# Patient Record
Sex: FEMALE | Race: WHITE | NOT HISPANIC OR LATINO | URBAN - METROPOLITAN AREA
[De-identification: names, ages, dates, MRNs, and addresses within clinical notes are randomized per-mention and may not be internally consistent; named-entity substitution may affect disease eponyms.]

---

## 2022-08-16 ENCOUNTER — OFFICE VISIT (OUTPATIENT)
Dept: URBAN - METROPOLITAN AREA CLINIC 35 | Facility: CLINIC | Age: 77
End: 2022-08-16
Payer: MEDICARE

## 2022-08-16 ENCOUNTER — DASHBOARD ENCOUNTERS (OUTPATIENT)
Age: 77
End: 2022-08-16

## 2022-08-16 VITALS
BODY MASS INDEX: 30.55 KG/M2 | OXYGEN SATURATION: 97 % | WEIGHT: 166 LBS | DIASTOLIC BLOOD PRESSURE: 78 MMHG | SYSTOLIC BLOOD PRESSURE: 134 MMHG | HEIGHT: 62 IN | HEART RATE: 70 BPM

## 2022-08-16 DIAGNOSIS — R11.0 NAUSEA: ICD-10-CM

## 2022-08-16 DIAGNOSIS — R09.89 GLOBUS SENSATION: ICD-10-CM

## 2022-08-16 DIAGNOSIS — K91.5 POST-CHOLECYSTECTOMY SYNDROME: ICD-10-CM

## 2022-08-16 DIAGNOSIS — Z86.010 PERSONAL HISTORY OF COLONIC POLYPS: ICD-10-CM

## 2022-08-16 PROBLEM — 90782003: Status: ACTIVE | Noted: 2022-08-16

## 2022-08-16 PROBLEM — 267103008: Status: ACTIVE | Noted: 2022-08-16

## 2022-08-16 PROCEDURE — 99204 OFFICE O/P NEW MOD 45 MIN: CPT | Performed by: INTERNAL MEDICINE

## 2022-08-16 RX ORDER — MECLIZINE HYDROCHLORIDE 25 MG/1
AS DIRECTED TABLET ORAL
Status: ACTIVE | COMMUNITY

## 2022-08-16 RX ORDER — CHOLESTYRAMINE 4 G/9G
1/2 SCOOP POWDER, FOR SUSPENSION ORAL TWICE A DAY
Qty: 120 GRAM | Refills: 3 | OUTPATIENT
Start: 2022-08-16

## 2022-08-16 RX ORDER — LEVOTHYROXINE SODIUM 88 UG/1
1 TABLET IN THE MORNING ON AN EMPTY STOMACH TABLET ORAL ONCE A DAY
Status: ACTIVE | COMMUNITY

## 2022-08-16 RX ORDER — PROPRANOLOL HYDROCHLORIDE 10 MG/1
1 TABLET TABLET ORAL ONCE A DAY
Status: ACTIVE | COMMUNITY

## 2022-08-16 RX ORDER — AMLODIPINE BESYLATE 10 MG/1
1 TABLET TABLET ORAL ONCE A DAY
Status: ACTIVE | COMMUNITY

## 2022-08-16 RX ORDER — ASPIRIN 81 MG/1
1 TABLET TABLET, CHEWABLE ORAL ONCE A DAY
Status: ACTIVE | COMMUNITY

## 2022-08-16 RX ORDER — ONDANSETRON HYDROCHLORIDE 8 MG/1
1 CAPSULE TABLET, FILM COATED ORAL
Status: ACTIVE | COMMUNITY

## 2022-08-16 RX ORDER — TRAZODONE HYDROCHLORIDE 100 MG/1
1 TABLET AT BEDTIME TABLET ORAL ONCE A DAY
Status: ACTIVE | COMMUNITY

## 2022-08-16 NOTE — HPI-NAUSEA
Patient is here for an initial consultation due to nausea.  Onset of symptoms:  since she had stroke in 2015 and 2016 Description: intermittent nausea thatis worse when she exercise or walking Associated symptoms: denies vomiting  Medications tried: Patient was previously on Zofran 8 mg daily but has no relief of symptoms Also tried Phenergan and help relief some symptoms when she started taking medication but lost effect over time Test/evaluations done previously: No recent imaging studies done recently + post-nasal drainage, on Claritin

## 2022-08-16 NOTE — HPI-GLOBUS
Patient is here for an initial consultation due to Globus sensation  Onset of symptoms: Since her stroke 10/2015 and 01/2016, worse in ~ 6 months Descriptions: globulus sensation in the esophagus Patient reports she drinks liquids which helps relief sensation  Associated symptoms: belching and hiccups Patient also reports intermittent mild to moderate epigastric pain  Medications tried: Patient was previously on Pantoprazole and Omeprazole for several years Also tried Pepcid but hasn't taken PPI or H2RB for about a year  Test/evaluations: [facility]  [facility], gastric ulcer Also had a Colonoscopy and reportedly with evidence of colitis

## 2022-08-16 NOTE — HPI-DIARRHEA
Patient is here for an initial consultation due to diarrhea. Onset of symptoms: Several years, recently worse  Descriptions:  4-8 BM daily, type 6-7 stools on BS scale and then has 3-4 without having any bowel movements  Associated symptoms: Patient reports urgency before bowel movements soon after eating.  No nocturnal symptoms  Patient denies lower abdominal pain or cramping  Medications tried:  Patient takes Imodium 2 caps daily-BID, QOD.  Could not tolerate Cholestyramine due to taste. Patient denies rectal bleeding, blood in stool or melena Test/evaluations done previously: Last Colonoscopy done 2017 (Dr. Leida Hardwick), reportedly polyps were found and evidence of diverticulosis

## 2022-10-18 ENCOUNTER — OFFICE VISIT (OUTPATIENT)
Dept: URBAN - METROPOLITAN AREA CLINIC 35 | Facility: CLINIC | Age: 77
End: 2022-10-18

## 2022-11-17 PROBLEM — 428283002: Status: ACTIVE | Noted: 2022-08-16

## 2022-11-22 ENCOUNTER — OFFICE VISIT (OUTPATIENT)
Dept: URBAN - METROPOLITAN AREA CLINIC 35 | Facility: CLINIC | Age: 77
End: 2022-11-22

## 2022-11-22 RX ORDER — CHOLESTYRAMINE 4 G/9G
1/2 SCOOP POWDER, FOR SUSPENSION ORAL TWICE A DAY
Qty: 120 GRAM | Refills: 3 | OUTPATIENT

## 2022-11-22 NOTE — HPI-NAUSEA
Patient is here for a routine follow up for nausea.  Last OV was 3 months ago Patient has had nausea since she had a stroke in 2015 and 2016. Patient reported nausea was  intermittent and worse when she exercise or is walking Patient continues taking Zofran + Phenergan PRN Current symptoms: ?